# Patient Record
Sex: MALE | Race: WHITE | NOT HISPANIC OR LATINO | URBAN - METROPOLITAN AREA
[De-identification: names, ages, dates, MRNs, and addresses within clinical notes are randomized per-mention and may not be internally consistent; named-entity substitution may affect disease eponyms.]

---

## 2019-05-27 ENCOUNTER — EMERGENCY (EMERGENCY)
Facility: HOSPITAL | Age: 49
LOS: 1 days | Discharge: HOME | End: 2019-05-27
Attending: EMERGENCY MEDICINE
Payer: COMMERCIAL

## 2019-05-27 VITALS
TEMPERATURE: 98 F | OXYGEN SATURATION: 97 % | HEIGHT: 74 IN | DIASTOLIC BLOOD PRESSURE: 84 MMHG | WEIGHT: 164.91 LBS | HEART RATE: 88 BPM | RESPIRATION RATE: 20 BRPM | SYSTOLIC BLOOD PRESSURE: 124 MMHG

## 2019-05-27 VITALS
DIASTOLIC BLOOD PRESSURE: 76 MMHG | SYSTOLIC BLOOD PRESSURE: 119 MMHG | RESPIRATION RATE: 18 BRPM | TEMPERATURE: 98 F | OXYGEN SATURATION: 99 % | HEART RATE: 78 BPM

## 2019-05-27 PROCEDURE — 73030 X-RAY EXAM OF SHOULDER: CPT

## 2019-05-27 PROCEDURE — 99284 EMERGENCY DEPT VISIT MOD MDM: CPT | Mod: 25

## 2019-05-27 PROCEDURE — 73030 X-RAY EXAM OF SHOULDER: CPT | Mod: 26,LT

## 2019-05-27 PROCEDURE — 96374 THER/PROPH/DIAG INJ IV PUSH: CPT

## 2019-05-27 PROCEDURE — 99283 EMERGENCY DEPT VISIT LOW MDM: CPT

## 2019-05-27 RX ORDER — KETOROLAC TROMETHAMINE 30 MG/ML
30 SYRINGE (ML) INJECTION ONCE
Refills: 0 | Status: DISCONTINUED | OUTPATIENT
Start: 2019-05-27 | End: 2019-05-27

## 2019-05-27 RX ADMIN — Medication 30 MILLIGRAM(S): at 23:38

## 2019-05-27 NOTE — ED ADULT NURSE NOTE - CHPI ED NUR SYMPTOMS NEG
no tingling/no weakness/no difficulty bearing weight/no abrasion/no bruising/no numbness/no fever/no back pain

## 2019-05-27 NOTE — ED ADULT NURSE NOTE - NSSUSCREENINGQ2_ED_ALL_ED
Pulmonary/Chest: Effort normal and breath sounds normal. No respiratory distress. Abdominal: Soft. He exhibits no distension. Musculoskeletal: Normal range of motion. He exhibits tenderness. Left forearm: He exhibits tenderness, swelling and laceration. He exhibits no bony tenderness and no deformity. Arms:  Neurological: He is alert and oriented to person, place, and time. Skin: Skin is warm and dry. No rash noted. Psychiatric: He has a normal mood and affect. Nursing note and vitals reviewed. Vitals:    Vitals:    01/04/18 0432   BP: (!) 115/94   Pulse: 83   Resp: 18   Temp: 97.8 °F (36.6 °C)   TempSrc: Oral   SpO2: 98%   Weight: 170 lb (77.1 kg)   Height: 5' 9\" (1.753 m)       ORDERS AND MEDICATIONS     PLAN (LABS / IMAGING / EKG):  No orders of the defined types were placed in this encounter. Plan of care is reviewed and discussed with the family/ patient when able. Family/ Patient consents to treatment and plan if able to do so. MEDICATIONS ORDERED:  Orders Placed This Encounter   Medications    Tetanus-Diphth-Acell Pertussis (BOOSTRIX) injection 0.5 mL    ceFAZolin (ANCEF) 1 g in dextrose 5 % 50 mL IVPB (premix)    ibuprofen (ADVIL;MOTRIN) tablet 800 mg    ibuprofen (ADVIL;MOTRIN) 800 MG tablet     Sig: Take 1 tablet by mouth every 8 hours as needed for Pain     Dispense:  30 tablet     Refill:  0    cephALEXin (KEFLEX) 500 MG capsule     Sig: Take 1 capsule by mouth 2 times daily for 5 days     Dispense:  10 capsule     Refill:  0       DIAGNOSTIC RESULTS      LABS:  No results found for this visit on 01/04/18. Labs Reviewed - No data to display    RADIOLOGY:  No results found.       EKG      All EKG's are interpreted by the Emergency Department Physician who either signs or Co-signs this chart in the absence of a cardiologist.    ED BEDSIDE ULTRASOUND:   Not indicated      DIFFERENTIAL DX / [facility]     DIFFERENTIAL No

## 2019-05-27 NOTE — ED PROVIDER NOTE - PHYSICAL EXAMINATION
left shoulder tenderness, no bony deformities, distal radial and ulnar pulses intact, cap refill < 2 seconds, full range of motion of arm +elbow flexion/extension/supination and pronation intact, +flexion and extension of all digits at DIP and PIP.
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
## 2019-05-27 NOTE — ED ADULT NURSE NOTE - NSIMPLEMENTINTERV_GEN_ALL_ED
Implemented All Fall Risk Interventions:  [place] to call system. Call bell, personal items and telephone within reach. Instruct patient to call for assistance. Room bathroom lighting operational. Non-slip footwear when patient is off stretcher. Physically safe environment: no spills, clutter or unnecessary equipment. Stretcher in lowest position, wheels locked, appropriate side rails in place. Provide visual cue, wrist band, yellow gown, etc. Monitor gait and stability. Monitor for mental status changes and reorient to person, place, and time. Review medications for side effects contributing to fall risk. Reinforce activity limits and safety measures with patient and family.
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
## 2019-05-27 NOTE — ED PROVIDER NOTE - CLINICAL SUMMARY MEDICAL DECISION MAKING FREE TEXT BOX
Pt is neurovascularly intact in shoulder immobilizer. Pt is well appearing walking with normal gait, stable for discharge and follow up with medical doctor. Pt educated on care and need for follow up. Discussed anticipatory guidance and return precautions. Questions answered. I had a detailed discussion with the patient and/or guardian regarding the historical points, exam findings, and any diagnostic results supporting the discharge diagnosis.   pt given contact to f/u with ortho.

## 2021-05-25 ENCOUNTER — EMERGENCY (EMERGENCY)
Facility: HOSPITAL | Age: 51
LOS: 1 days | Discharge: HOME | End: 2021-05-25
Attending: EMERGENCY MEDICINE
Payer: COMMERCIAL

## 2021-05-25 VITALS
HEIGHT: 74 IN | RESPIRATION RATE: 18 BRPM | OXYGEN SATURATION: 98 % | HEART RATE: 79 BPM | SYSTOLIC BLOOD PRESSURE: 137 MMHG | WEIGHT: 175.05 LBS | TEMPERATURE: 98 F | DIASTOLIC BLOOD PRESSURE: 90 MMHG

## 2021-05-25 PROBLEM — Z78.9 OTHER SPECIFIED HEALTH STATUS: Chronic | Status: ACTIVE | Noted: 2019-05-27

## 2021-05-25 PROCEDURE — 73140 X-RAY EXAM OF FINGER(S): CPT

## 2021-05-25 PROCEDURE — 99283 EMERGENCY DEPT VISIT LOW MDM: CPT | Mod: 25

## 2021-05-25 PROCEDURE — 99283 EMERGENCY DEPT VISIT LOW MDM: CPT

## 2021-05-25 PROCEDURE — 73140 X-RAY EXAM OF FINGER(S): CPT | Mod: 26,RT

## 2021-05-25 NOTE — ED PROVIDER NOTE - CLINICAL SUMMARY MEDICAL DECISION MAKING FREE TEXT BOX
Neurovascularly intact. Low suspicion of dislocation and fx. High suspicion of mallet finger, will do X-ray and splint and d/c.

## 2021-05-25 NOTE — ED PROVIDER NOTE - CARE PROVIDERS DIRECT ADDRESSES
[email or web address]

## 2021-05-25 NOTE — ED PROVIDER NOTE - PATIENT PORTAL LINK FT
You can access the FollowMyHealth Patient Portal offered by [facility] by registering at the following website: [email or web address]. By joining [employer]’s FollowMyHealth portal, you will also be able to view your health information using other applications (apps) compatible with our system.

## 2021-05-25 NOTE — ED PROVIDER NOTE - PHYSICAL EXAMINATION
R hand pinky digit DIP in flexed position. Able to fully extend passively. Pt unable to actively extend finger. No deformity

## 2021-05-25 NOTE — ED PROVIDER NOTE - PROGRESS NOTE DETAILS
XR negative. Dx mallet finger. WIll dc with ortho hand follow up within 1 week. Pt is well appearing walking with steady gait, stable for discharge and follow up without fail with medical doctor. I had a detailed discussion with the patient and/or guardian regarding the historical points, exam findings, and any diagnostic results supporting the discharge diagnosis. Pt educated on care and need for follow up. Strict return instructions and red flag signs and symptoms discussed with patient. Questions answered. Pt shows understanding of discharge information and agrees to follow.

## 2021-05-25 NOTE — ED PROVIDER NOTE - ATTENDING CONTRIBUTION TO CARE
I was physically present for the E/M service provided. I agree with above history, physical, and plan which I have reviewed and edited where appropriate. I was physically present for the key portions of the service provided.    [place]: 51 y/o M pt with no significant PMHx presents to ED c/o R 5th finger stuck in flexed position just PTA. Pt was scratching his chest at home when he heard a pop and looked down and saw his pinky appeared to remain flexed. no blunt trauma    mallet deformity, no swelling    a/p: mallet deformity. splint and hand follow up

## 2021-05-25 NOTE — ED PROVIDER NOTE - NSFOLLOWUPINSTRUCTIONS_ED_ALL_ED_FT
Follow up with the orthopedist within 1 week.  If you experience any new or worsening symptoms or if you are concerned you can always come back to the emergency for a re-evaluation.  If there were any prescriptions given to you during the visit today take them as prescribed. If you have any questions you can ask the pharmacist.

## 2021-05-25 NOTE — ED PROVIDER NOTE - CARE PROVIDER_API CALL
Campbell Martínez)  Orthopaedic Surgery  [address] [place] [address], [address]  [place] [address]  Phone: [phone]  Fax: [phone]  Follow Up Time:     Sly Rangel)  Orthopaedic Surgery  [address] [facility] [address]  [place] [address]  Phone: [phone]  Fax: [phone]  Follow Up Time:

## 2021-05-25 NOTE — ED PROVIDER NOTE - OBJECTIVE STATEMENT
51 y/o M pt with no significant PMHx presents to ED c/o R 5th finger stuck in flexed position just PTA. Pt was scratching his chest at home when he heard a pop and looked down and saw his pinky appeared to remain flexed. Pt still with full range of motion of all 5 fingers. Pt denies numbness, tingling, pain or any other acute complaints. NKDA.

## 2023-06-18 ENCOUNTER — EMERGENCY (EMERGENCY)
Facility: HOSPITAL | Age: 53
LOS: 1 days | Discharge: HOME | End: 2023-06-18
Attending: EMERGENCY MEDICINE
Payer: COMMERCIAL

## 2023-06-18 VITALS
DIASTOLIC BLOOD PRESSURE: 88 MMHG | TEMPERATURE: 98 F | SYSTOLIC BLOOD PRESSURE: 127 MMHG | HEIGHT: 72 IN | OXYGEN SATURATION: 96 % | WEIGHT: 175.05 LBS | RESPIRATION RATE: 16 BRPM | HEART RATE: 85 BPM

## 2023-06-18 LAB
ALBUMIN SERPL ELPH-MCNC: 3.6 G/DL — SIGNIFICANT CHANGE UP (ref 3.5–5)
ALP SERPL-CCNC: 75 U/L — SIGNIFICANT CHANGE UP (ref 40–120)
ALT FLD-CCNC: 25 U/L DA — SIGNIFICANT CHANGE UP (ref 10–60)
ANION GAP SERPL CALC-SCNC: 8 MMOL/L — SIGNIFICANT CHANGE UP (ref 5–17)
AST SERPL-CCNC: 11 U/L — SIGNIFICANT CHANGE UP (ref 10–40)
BASOPHILS # BLD AUTO: 0.09 K/UL — SIGNIFICANT CHANGE UP (ref 0–0.2)
BASOPHILS NFR BLD AUTO: 0.7 % — SIGNIFICANT CHANGE UP (ref 0–2)
BILIRUB SERPL-MCNC: 0.3 MG/DL — SIGNIFICANT CHANGE UP (ref 0.2–1.2)
BUN SERPL-MCNC: 19 MG/DL — HIGH (ref 7–18)
CALCIUM SERPL-MCNC: 8.8 MG/DL — SIGNIFICANT CHANGE UP (ref 8.4–10.5)
CHLORIDE SERPL-SCNC: 110 MMOL/L — HIGH (ref 96–108)
CO2 SERPL-SCNC: 25 MMOL/L — SIGNIFICANT CHANGE UP (ref 22–31)
CREAT SERPL-MCNC: 1.11 MG/DL — SIGNIFICANT CHANGE UP (ref 0.5–1.3)
EGFR: 80 ML/MIN/1.73M2 — SIGNIFICANT CHANGE UP
EOSINOPHIL # BLD AUTO: 1.02 K/UL — HIGH (ref 0–0.5)
EOSINOPHIL NFR BLD AUTO: 8.2 % — HIGH (ref 0–6)
GLUCOSE SERPL-MCNC: 109 MG/DL — HIGH (ref 70–99)
HCT VFR BLD CALC: 44.1 % — SIGNIFICANT CHANGE UP (ref 39–50)
HGB BLD-MCNC: 14.8 G/DL — SIGNIFICANT CHANGE UP (ref 13–17)
IMM GRANULOCYTES NFR BLD AUTO: 0.2 % — SIGNIFICANT CHANGE UP (ref 0–0.9)
LYMPHOCYTES # BLD AUTO: 2.96 K/UL — SIGNIFICANT CHANGE UP (ref 1–3.3)
LYMPHOCYTES # BLD AUTO: 23.8 % — SIGNIFICANT CHANGE UP (ref 13–44)
MCHC RBC-ENTMCNC: 30 PG — SIGNIFICANT CHANGE UP (ref 27–34)
MCHC RBC-ENTMCNC: 33.6 GM/DL — SIGNIFICANT CHANGE UP (ref 32–36)
MCV RBC AUTO: 89.3 FL — SIGNIFICANT CHANGE UP (ref 80–100)
MONOCYTES # BLD AUTO: 1.3 K/UL — HIGH (ref 0–0.9)
MONOCYTES NFR BLD AUTO: 10.5 % — SIGNIFICANT CHANGE UP (ref 2–14)
NEUTROPHILS # BLD AUTO: 7.03 K/UL — SIGNIFICANT CHANGE UP (ref 1.8–7.4)
NEUTROPHILS NFR BLD AUTO: 56.6 % — SIGNIFICANT CHANGE UP (ref 43–77)
NRBC # BLD: 0 /100 WBCS — SIGNIFICANT CHANGE UP (ref 0–0)
PLATELET # BLD AUTO: 340 K/UL — SIGNIFICANT CHANGE UP (ref 150–400)
POTASSIUM SERPL-MCNC: 3.7 MMOL/L — SIGNIFICANT CHANGE UP (ref 3.5–5.3)
POTASSIUM SERPL-SCNC: 3.7 MMOL/L — SIGNIFICANT CHANGE UP (ref 3.5–5.3)
PROT SERPL-MCNC: 7.4 G/DL — SIGNIFICANT CHANGE UP (ref 6–8.3)
RBC # BLD: 4.94 M/UL — SIGNIFICANT CHANGE UP (ref 4.2–5.8)
RBC # FLD: 12.2 % — SIGNIFICANT CHANGE UP (ref 10.3–14.5)
SODIUM SERPL-SCNC: 143 MMOL/L — SIGNIFICANT CHANGE UP (ref 135–145)
WBC # BLD: 12.42 K/UL — HIGH (ref 3.8–10.5)
WBC # FLD AUTO: 12.42 K/UL — HIGH (ref 3.8–10.5)

## 2023-06-18 PROCEDURE — 80053 COMPREHEN METABOLIC PANEL: CPT

## 2023-06-18 PROCEDURE — 99284 EMERGENCY DEPT VISIT MOD MDM: CPT | Mod: 25

## 2023-06-18 PROCEDURE — 96374 THER/PROPH/DIAG INJ IV PUSH: CPT

## 2023-06-18 PROCEDURE — 85025 COMPLETE CBC W/AUTO DIFF WBC: CPT

## 2023-06-18 PROCEDURE — 99284 EMERGENCY DEPT VISIT MOD MDM: CPT

## 2023-06-18 PROCEDURE — 94640 AIRWAY INHALATION TREATMENT: CPT

## 2023-06-18 PROCEDURE — 36415 COLL VENOUS BLD VENIPUNCTURE: CPT

## 2023-06-18 RX ORDER — ALBUTEROL 90 UG/1
3 AEROSOL, METERED ORAL
Qty: 36 | Refills: 0
Start: 2023-06-18 | End: 2023-07-17

## 2023-06-18 RX ORDER — ALBUTEROL 90 UG/1
2 AEROSOL, METERED ORAL
Qty: 3.7 | Refills: 0
Start: 2023-06-18 | End: 2023-07-17

## 2023-06-18 RX ORDER — IPRATROPIUM/ALBUTEROL SULFATE 18-103MCG
3 AEROSOL WITH ADAPTER (GRAM) INHALATION ONCE
Refills: 0 | Status: COMPLETED | OUTPATIENT
Start: 2023-06-18 | End: 2023-06-18

## 2023-06-18 RX ADMIN — Medication 3 MILLILITER(S): at 19:26

## 2023-06-18 RX ADMIN — Medication 3 MILLILITER(S): at 19:31

## 2023-06-18 RX ADMIN — Medication 125 MILLIGRAM(S): at 19:26

## 2023-06-18 NOTE — ED PROVIDER NOTE - PROGRESS NOTE DETAILS
wheezing resolved. feels improved, asking to go home. will dc. rx for albuterol and prednisone. f/u with PMD. return precautions discussed.

## 2023-06-18 NOTE — ED ADULT TRIAGE NOTE - CHIEF COMPLAINT QUOTE
C/o "my breathing is off, I have asthma, I used my albuterol nebulizer but its not breaking it" C/o "my breathing is off, I have asthma, I used my albuterol nebulizer but its not breaking it". Peak flow 260

## 2023-06-18 NOTE — ED PROVIDER NOTE - CARDIAC, MLM
Hypoglycemia guideline Normal rate, regular rhythm.  Heart sounds S1, S2.  No murmurs, rubs or gallops.

## 2023-06-18 NOTE — ED PROVIDER NOTE - NSFOLLOWUPINSTRUCTIONS_ED_ALL_ED_FT
Asthma, Adult  Outline of a person's upper body showing the lungs, with close-ups of two airways, one normal and one narrow.  Asthma is a long-term (chronic) condition that causes recurrent episodes in which the lower airways in the lungs become tight and narrow. The narrowing is caused by inflammation and tightening of the smooth muscle around the lower airways.    Asthma episodes, also called asthma attacks or asthma flares, may cause coughing, making high-pitched whistling sounds when you breathe, most often when you breathe out (wheezing), shortness of breath, and chest pain. The airways may produce extra mucus caused by the inflammation and irritation. During an attack, it can be difficult to breathe. Asthma attacks can range from minor to life-threatening.    Asthma cannot be cured, but medicines and lifestyle changes can help control it and treat acute attacks. It is important to keep your asthma well controlled so the condition does not interfere with your daily life.    What are the causes?  This condition is believed to be caused by inherited (genetic) and environmental factors, but its exact cause is not known.    What can trigger an asthma attack?    Many things can bring on an asthma attack or make symptoms worse. These triggers are different for every person. Common triggers include:  Allergens and irritants like mold, dust, pet dander, cockroaches, pollen, air pollution, and chemical odors.  Cigarette smoke.  Weather changes and cold air.  Stress and strong emotional responses such as crying or laughing hard.  Certain medications such as aspirin or beta blockers.  Infections and inflammatory conditions, such as the flu, a cold, pneumonia, or inflammation of the nasal membranes (rhinitis).  Gastroesophageal reflux disease (GERD).  What are the signs or symptoms?  Symptoms may occur right after exposure to an asthma trigger or hours later and can vary by person. Common signs and symptoms include:  Wheezing.  Trouble breathing (shortness of breath).  Excessive nighttime or early morning coughing.  Chest tightness.  Tiredness (fatigue) with minimal activity.  Difficulty talking in complete sentences.  Poor exercise tolerance.  How is this diagnosed?  This condition is diagnosed based on:  A physical exam and your medical history.  Tests, which may include:  Lung function studies to evaluate the flow of air in your lungs.  Allergy tests.  Imaging tests, such as X-rays.  How is this treated?  There is no cure, but symptoms can be controlled with proper treatment. Treatment usually involves:  Identifying and avoiding your asthma triggers.  Inhaled medicines. Two types are commonly used to treat asthma, depending on severity:  Controller medicines. These help prevent asthma symptoms from occurring. They are taken every day.  Fast-acting reliever or rescue medicines. These quickly relieve asthma symptoms. They are used as needed and provide short-term relief.  Using other medicines, such as:  Allergy medicines, such as antihistamines, if your asthma attacks are triggered by allergens.  Immune medicines (immunomodulators). These are medicines that help control the immune system.  Using supplemental oxygen. This is only needed during a severe episode.  Creating an asthma action plan. An asthma action plan is a written plan for managing and treating your asthma attacks. This plan includes:  A list of your asthma triggers and how to avoid them.  Information about when medicines should be taken and when their dosage should be changed.  Instructions about using a device called a peak flow meter. A peak flow meter measures how well the lungs are working and the severity of your asthma. It helps you monitor your condition.  Follow these instructions at home:  Take over-the-counter and prescription medicines only as told by your health care provider.  Stay up to date on all vaccinations as recommended by your healthcare provider, including vaccines for the flu and pneumonia.  Use a peak flow meter and keep track of your peak flow readings.  Understand and use your asthma action plan to address any asthma flares.  Do not smoke or allow anyone to smoke in your home.  Contact a health care provider if:  You have wheezing, shortness of breath, or a cough that is not responding to medicines.  Your medicines are causing side effects, such as a rash, itching, swelling, or trouble breathing.  You need to use a reliever medicine more than 2–3 times a week.  Your peak flow reading is still at 50–79% of your personal best after following your action plan for 1 hour.  You have a fever and shortness of breath.  Get help right away if:  You are getting worse and do not respond to treatment during an asthma attack.  You are short of breath when at rest or when doing very little physical activity.  You have difficulty eating, drinking, or talking.  You have chest pain or tightness.  You develop a fast heartbeat or palpitations.  You have a bluish color to your lips or fingernails.  You are light-headed or dizzy, or you faint.  Your peak flow reading is less than 50% of your personal best.  You feel too tired to breathe normally.  These symptoms may be an emergency. Get help right away. Call 911.  Do not wait to see if the symptoms will go away.  Do not drive yourself to the hospital.  Summary  Asthma is a long-term (chronic) condition that causes recurrent episodes in which the airways become tight and narrow. Asthma episodes, also called asthma attacks or asthma flares, can cause coughing, wheezing, shortness of breath, and chest pain.  Asthma cannot be cured, but medicines and lifestyle changes can help keep it well controlled and prevent asthma flares.  Make sure you understand how to avoid triggers and how and when to use your medicines.  Asthma attacks can range from minor to life-threatening. Get help right away if you have an asthma attack and do not respond to treatment with your usual rescue medicines.  This information is not intended to replace advice given to you by your health care provider. Make sure you discuss any questions you have with your health care provider.    Document Revised: 10/05/2022 Document Reviewed: 09/26/2022

## 2023-06-18 NOTE — ED PROVIDER NOTE - PATIENT PORTAL LINK FT
You can access the FollowMyHealth Patient Portal offered by [facility] by registering at the following website: [email or web address]. By joining [employer]’s FollowMyHealth portal, you will also be able to view your health information using other applications (apps) compatible with our system.

## 2023-06-18 NOTE — ED PROVIDER NOTE - OBJECTIVE STATEMENT
52 year old male PMH asthma coming in with few days of asthma exacerbation not completely relieved by his nebs at home. denies all other complaints.

## 2023-06-19 NOTE — ED ADULT NURSE NOTE - OBJECTIVE STATEMENT
As per pt, c/o SOB s/p asthma exacerbation today. Pt reports, h/o oral intubation for asthma in the past. Pt denies all other symptoms.

## 2023-06-19 NOTE — ED ADULT NURSE NOTE - CHIEF COMPLAINT QUOTE
C/o "my breathing is off, I have asthma, I used my albuterol nebulizer but its not breaking it". Peak flow 260

## 2023-06-19 NOTE — ED ADULT NURSE NOTE - NSFALLUNIVINTERV_ED_ALL_ED
Bed/Stretcher in lowest position, wheels locked, appropriate side rails in place/Call bell, personal items and telephone in reach/Instruct patient to call for assistance before getting out of bed/chair/stretcher/Non-slip footwear applied when patient is off stretcher/[place] to call system/Physically safe environment - no spills, clutter or unnecessary equipment/Purposeful proactive rounding/Room/bathroom lighting operational, light cord in reach

## 2025-05-27 ENCOUNTER — EMERGENCY (EMERGENCY)
Facility: HOSPITAL | Age: 55
LOS: 1 days | End: 2025-05-27
Attending: EMERGENCY MEDICINE
Payer: COMMERCIAL

## 2025-05-27 VITALS
RESPIRATION RATE: 18 BRPM | OXYGEN SATURATION: 97 % | DIASTOLIC BLOOD PRESSURE: 93 MMHG | HEART RATE: 73 BPM | SYSTOLIC BLOOD PRESSURE: 151 MMHG | TEMPERATURE: 98 F

## 2025-05-27 VITALS
HEIGHT: 73 IN | OXYGEN SATURATION: 94 % | WEIGHT: 169.98 LBS | HEART RATE: 85 BPM | DIASTOLIC BLOOD PRESSURE: 86 MMHG | RESPIRATION RATE: 17 BRPM | TEMPERATURE: 98 F | SYSTOLIC BLOOD PRESSURE: 129 MMHG

## 2025-05-27 PROCEDURE — 94640 AIRWAY INHALATION TREATMENT: CPT

## 2025-05-27 PROCEDURE — 99283 EMERGENCY DEPT VISIT LOW MDM: CPT | Mod: 25

## 2025-05-27 PROCEDURE — 99284 EMERGENCY DEPT VISIT MOD MDM: CPT

## 2025-05-27 RX ORDER — ALBUTEROL SULFATE 2.5 MG/3ML
2 VIAL, NEBULIZER (ML) INHALATION ONCE
Refills: 0 | Status: COMPLETED | OUTPATIENT
Start: 2025-05-27 | End: 2025-05-27

## 2025-05-27 RX ORDER — ALBUTEROL SULFATE 2.5 MG/3ML
2 VIAL, NEBULIZER (ML) INHALATION
Qty: 1 | Refills: 0
Start: 2025-05-27 | End: 2025-06-25

## 2025-05-27 RX ORDER — PREDNISONE 20 MG/1
60 TABLET ORAL ONCE
Refills: 0 | Status: COMPLETED | OUTPATIENT
Start: 2025-05-27 | End: 2025-05-27

## 2025-05-27 RX ORDER — PREDNISONE 20 MG/1
1 TABLET ORAL
Qty: 14 | Refills: 0
Start: 2025-05-27 | End: 2025-06-01

## 2025-05-27 RX ORDER — IPRATROPIUM BROMIDE AND ALBUTEROL SULFATE .5; 2.5 MG/3ML; MG/3ML
3 SOLUTION RESPIRATORY (INHALATION)
Refills: 0 | Status: ACTIVE | OUTPATIENT
Start: 2025-05-27 | End: 2025-05-27

## 2025-05-27 RX ADMIN — PREDNISONE 60 MILLIGRAM(S): 20 TABLET ORAL at 19:16

## 2025-05-27 RX ADMIN — IPRATROPIUM BROMIDE AND ALBUTEROL SULFATE 3 MILLILITER(S): .5; 2.5 SOLUTION RESPIRATORY (INHALATION) at 19:23

## 2025-05-27 RX ADMIN — IPRATROPIUM BROMIDE AND ALBUTEROL SULFATE 3 MILLILITER(S): .5; 2.5 SOLUTION RESPIRATORY (INHALATION) at 19:24

## 2025-05-27 NOTE — ED ADULT NURSE REASSESSMENT NOTE - NS ED NURSE REASSESS COMMENT FT1
Discharge papers reviewed  Denies questions or concerns  Pt ambulated out of department with steady gait

## 2025-05-27 NOTE — ED ADULT NURSE NOTE - OBJECTIVE STATEMENT
53 yo male presents to the ED for asthma exacerbation x 4 days, presents today b/ he ran out of his albuterol inhaler  Aside from asthma (made worse by season) denies medical history  Takes albuterol PRN for asthma exacerbation    Airway intact, speaking in clear complete sentences  Mild shortness of breath noted  Diffuse bilateral expiratory wheezes  Cap refill <2s  2+ pulses in extremities  No swelling to extremities

## 2025-05-27 NOTE — ED ADULT NURSE REASSESSMENT NOTE - NS ED NURSE REASSESS COMMENT FT1
Mild bilateral wheezes auscultated after 1st nebulizer treatment  2nd nebulizer treatment administered as per MAR  Patient assessed after 2nd nebulizer treatment, lungs clear to auscultation bilaterally, shortness of breath resolved, pt reports feeling better. Dr. Joiner made aware with plan to d/c and Rx being sent to pharmacy for albuterol inhaler

## 2025-05-27 NOTE — ED PROVIDER NOTE - NSFOLLOWUPINSTRUCTIONS_ED_ALL_ED_FT
Asthma    WHAT YOU NEED TO KNOW:    Asthma is a lung disease that makes breathing difficult. Chronic inflammation and reactions to triggers narrow the airways in the lungs. Asthma can become life-threatening if it is not managed.  Normal vs Asthmatic Bronchioles Adult    DISCHARGE INSTRUCTIONS:    Call your local emergency number (911 in the [place]) if:    You have severe shortness of breath.    The skin around your neck and ribs pulls in with each breath.    Your peak flow numbers are in the red zone of your AAP.  Seek care immediately if:    You have shortness of breath, even after you take your short-term medicine as directed.    Your lips or nails turn blue or gray.  Call your doctor or asthma specialist if:    You run out of medicine before your next refill is due.    Your symptoms get worse.    You need to take more medicine than usual to control your symptoms.    You have questions or concerns about your condition or care.  Medicines:    Medicines may be used to decrease inflammation, open airways, and make it easier to breathe. Medicines may be inhaled, taken as a pill, or injected. Short-term medicines relieve your symptoms quickly. Long-term medicines are used to prevent future asthma attacks. Other medicines may be needed if your regular medicines are not able to prevent attacks. You may also need medicine to help control your allergies. Ask your healthcare provider for more information about any medicine you are given and how to take it safely.    Take your medicine as directed. Contact your healthcare provider if you think your medicine is not helping or if you have side effects. Tell your provider if you are allergic to any medicine. Keep a list of the medicines, vitamins, and herbs you take. Include the amounts, and when and why you take them. Bring the list or the pill bottles to follow-up visits. Carry your medicine list with you in case of an emergency.  Manage your symptoms and prevent future attacks:    Follow your Asthma Action Plan (AAP). This is a written plan that you and your healthcare provider create. It explains which medicine you need and when to change doses if necessary. It also explains how you can monitor symptoms and use a peak flow meter. The meter measures how well your lungs are working.    Manage other health conditions, such as allergies, acid reflux, and sleep apnea.    Identify and avoid triggers. These may include pets, dust mites, mold, and cockroaches.    Do not smoke or be around others who smoke. Nicotine and other chemicals in cigarettes and cigars can cause lung damage. Ask your healthcare provider for information if you currently smoke and need help to quit. E-cigarettes or smokeless tobacco still contain nicotine. Talk to your healthcare provider before you use these products.    Ask about the flu vaccine. The flu can make your asthma worse. You may need a yearly flu shot.  Follow up with your healthcare provider as directed: You will need to return to make sure your medicine is working and your symptoms are controlled. You may be referred to an asthma or allergy specialist. You may be asked to keep a record of your peak flow values and bring it with you to your appointments. Write down your questions so you remember to ask them during your visits.    © [employer] 1973, 2025    	  back to top

## 2025-05-27 NOTE — ED PROVIDER NOTE - PATIENT PORTAL LINK FT
You can access the FollowMyHealth Patient Portal offered by [facility] by registering at the following website: [email or web address]. By joining [employer]’s FollowMyHealth portal, you will also be able to view your health information using other applications (apps) compatible with our system.

## 2025-05-27 NOTE — ED PROVIDER NOTE - OBJECTIVE STATEMENT
54-year-old male history of asthma reports triggers changes weather presenting with shortness of breath with nonproductive cough and wheezing since the morning time.  States he ran out of inhaler.  Came to the ER since he did not want his symptoms to get worse.  Denies any chest pain, fever, leg swelling, travel or sick contact. Denies any history intubations.

## 2025-05-27 NOTE — ED PROVIDER NOTE - CLINICAL SUMMARY MEDICAL DECISION MAKING FREE TEXT BOX
54-year-old male with history of asthma presenting with wheezing currently in no acute distress.  Speaking full sentences.  Does not want to remove clothing to examine him.  Exam to suggest acute asthma.  No obvious signs of pneumonia.  Demanding nebulizers steroids and and states he wants to leave immediately afterwards.  "This is not the first time I have done this".  Discussed plan of care and signs and symptoms to return sooner to ED.

## 2025-07-28 ENCOUNTER — EMERGENCY (EMERGENCY)
Facility: HOSPITAL | Age: 55
LOS: 1 days | End: 2025-07-28
Attending: EMERGENCY MEDICINE
Payer: COMMERCIAL

## 2025-07-28 VITALS
WEIGHT: 169.98 LBS | RESPIRATION RATE: 25 BRPM | SYSTOLIC BLOOD PRESSURE: 143 MMHG | OXYGEN SATURATION: 98 % | TEMPERATURE: 98 F | HEART RATE: 101 BPM | DIASTOLIC BLOOD PRESSURE: 103 MMHG

## 2025-07-28 PROCEDURE — 99284 EMERGENCY DEPT VISIT MOD MDM: CPT

## 2025-07-28 RX ORDER — ALBUTEROL SULFATE 2.5 MG/3ML
2 VIAL, NEBULIZER (ML) INHALATION
Qty: 1 | Refills: 0
Start: 2025-07-28

## 2025-07-28 RX ORDER — ALBUTEROL SULFATE 2.5 MG/3ML
3 VIAL, NEBULIZER (ML) INHALATION
Qty: 50 | Refills: 0
Start: 2025-07-28

## 2025-07-28 RX ORDER — PREDNISONE 20 MG/1
2 TABLET ORAL
Qty: 10 | Refills: 0
Start: 2025-07-28 | End: 2025-08-01

## 2025-07-28 NOTE — ED PROVIDER NOTE - WET READ LAUNCH FT
DISPLAY PLAN FREE TEXT There are no Wet Read(s) to document. DISPLAY PLAN FREE TEXT DISPLAY PLAN FREE TEXT DISPLAY PLAN FREE TEXT DISPLAY PLAN FREE TEXT DISPLAY PLAN FREE TEXT DISPLAY PLAN FREE TEXT DISPLAY PLAN FREE TEXT DISPLAY PLAN FREE TEXT DISPLAY PLAN FREE TEXT DISPLAY PLAN FREE TEXT DISPLAY PLAN FREE TEXT DISPLAY PLAN FREE TEXT DISPLAY PLAN FREE TEXT DISPLAY PLAN FREE TEXT DISPLAY PLAN FREE TEXT DISPLAY PLAN FREE TEXT DISPLAY PLAN FREE TEXT DISPLAY PLAN FREE TEXT DISPLAY PLAN FREE TEXT DISPLAY PLAN FREE TEXT DISPLAY PLAN FREE TEXT DISPLAY PLAN FREE TEXT DISPLAY PLAN FREE TEXT DISPLAY PLAN FREE TEXT

## 2025-07-28 NOTE — ED PROVIDER NOTE - NSFOLLOWUPINSTRUCTIONS_ED_ALL_ED_FT
Asthma Attack  Upper body outline showing parts of the respiratory system, highlighting the bronchi.  Asthma attack, also called asthma flare or acute bronchospasm, is the sudden narrowing and tightening of the lower airways (bronchi) in the lungs, that can make it hard to breathe. The narrowing is caused by inflammation and tightening of the smooth muscle that wraps around the lower airways in the lungs.    Asthma attacks may cause coughing, high-pitched whistling sounds when you breathe, most often when you breathe out (wheezing), trouble breathing (shortness of breath), and chest pain. The airways may produce extra mucus caused by the inflammation and irritation. During an asthma attack, it can be difficult to breathe. It is important to get treatment right away. Asthma attacks can range from minor to life-threatening.    What are the causes?  Possible causes or triggers of this condition include:  Household allergens like dust, pet dander, and cockroaches.  Mold and pollen from trees or grass.  Air pollutants such as household , aerosol sprays, strong odors, and smoke of any kind.  Weather changes and cold air.  Stress or strong emotions such as crying or laughing hard.  Exercise or activity that requires a lot of energy.  Certain medicines or medical conditions such as:  Aspirin or beta-blockers.  Infections or inflammatory conditions, such as a flu (influenza), a cold, pneumonia, or inflammation of the nasal membranes (rhinitis).  Gastroesophageal reflux disease (GERD). GERD is a condition in which stomach acid backs up into your esophagus and spills into your trachea (windpipe), which can irritate your airways.  What are the signs or symptoms?  Symptoms of this condition include:  Wheezing.  Excessive coughing. This may only happen at night.  Chest tightness or pain.  Shortness of breath.  Difficulty talking in complete sentences.  Feeling like you cannot get enough air, no matter how hard you breathe (air hunger).  How is this diagnosed?  This condition may be diagnosed based on:  A physical exam and your medical history.  Your symptoms.  Tests to check for other causes of your symptoms or other conditions that may have triggered your asthma attack. These tests may include:  A chest X-ray.  Blood tests.  Lung function studies (spirometry) to evaluate the flow of air in your lungs.  How is this treated?  Treatment for this condition depends on the severity and cause of your asthma attack.  For mild attacks, you may receive medicines through a hand-held inhaler (metered dose inhaler or MDI) or through a device that turns liquid medicine into a mist (nebulizer). These medicines include:  Quick relief or rescue medicines that quickly relax the airways and lungs.  Long-acting medicines that are used daily to prevent (control) your asthma symptoms.  For moderate or severe attacks, you may be treated with steroid medicines by mouth or through an IV injection at the hospital.  For severe attacks, you may need oxygen therapy or a breathing machine (ventilator).  If your asthma attack was caused by an infection from bacteria, you will be given antibiotic medicines.  Follow these instructions at home:  Medicines    Take over-the-counter and prescription medicines only as told by your health care provider.  If you were prescribed an antibiotic medicine, take it as told by your health care provider. Do not stop using the antibiotic even if you start to feel better.  Tell your doctor if you are pregnant or may be pregnant to make sure your asthma medicine is safe to use during pregnancy.  Avoiding triggers    A sign showing that a person should not smoke.  Keep track of things that trigger your asthma attacks. Avoid exposure to these triggers.  Do not use any products that contain nicotine or tobacco. These products include cigarettes, chewing tobacco, and vaping devices, such as e-cigarettes. If you need help quitting, ask your health care provider.  When there is a lot of pollen, air pollution, or humidity, keep windows closed and use an air conditioner or go to places with air conditioning.  Asthma action plan    Work with your health care provider to make a written plan for managing and treating your asthma attacks (asthma action plan). This plan should include:  A list of your asthma triggers and how to avoid them.  A list of symptoms that you may have during an asthma attack.  Information about which medicine to take, when to take the medicine, and how much of the medicine to take.  Information to help you understand your peak flow measurements.  Daily actions that you can take to control your asthma symptoms.  Contact information for your health care providers.  If you have an asthma attack, act quickly. Follow the emergency steps on your written asthma action plan. This may prevent you from needing to go to the hospital.  Talk to a family member or close friend about your asthma action plan and who to contact in case you need help.  General instructions    Avoid excessive exercise or activity until your asthma attack goes away.  Stay up to date on all your vaccines, such as flu and pneumonia vaccines.  Keep all follow-up visits. This is important.  Contact a health care provider if:  You have followed your action plan for 1 hour and your peak flow reading is still at 50–79% of your personal best. This is in the yellow zone, which means "caution."  You need to use your quick reliever medicine more frequently than normal.  Your medicines are causing side effects, such as rash, itching, swelling, or trouble breathing.  Your symptoms do not improve after taking medicine.  You have a fever.  Get help right away if:  Your peak flow reading is less than 50% of your personal best. This is in the red zone, which means "danger."  You develop chest pain or discomfort.  Your medicines no longer seem to be helping.  You are coughing up bloody mucus.  You have a fever and your symptoms suddenly get worse.  You have trouble swallowing.  You feel very tired, and breathing becomes tiring.  These symptoms may be an emergency. Get help right away. Call 911.  Do not wait to see if the symptoms will go away.  Do not drive yourself to the hospital.  Summary  Asthma attacks are caused by narrowing or tightness in air passages, which causes shortness of breath, coughing, and wheezing.  Many things can trigger an asthma attack, such as allergens, weather changes, exercise, strong odors, and smoke of any kind.  If you have an asthma attack, act quickly. Follow the emergency steps on your written asthma action plan.  Get help right away if you have severe trouble breathing, chest pain, or fever, or if your home medicines are no longer helping with your symptoms.  This information is not intended to replace advice given to you by your health care provider. Make sure you discuss any questions you have with your health care provider.    Document Revised: 10/09/2022 Document Reviewed: 10/09/2022  [employer]vier Patient Education © 2025 [employer]

## 2025-07-28 NOTE — ED ADULT NURSE NOTE - NSFALLUNIVINTERV_ED_ALL_ED
Bed/Stretcher in lowest position, wheels locked, appropriate side rails in place/Call bell, personal items and telephone in reach/Instruct patient to call for assistance before getting out of bed/chair/stretcher/Non-slip footwear applied when patient is off stretcher/[place] to call system/Physically safe environment - no spills, clutter or unnecessary equipment/Purposeful proactive rounding/Room/bathroom lighting operational, light cord in reach

## 2025-07-28 NOTE — ED PROVIDER NOTE - PATIENT PORTAL LINK FT
You can access the FollowMyHealth Patient Portal offered by [facility] by registering at the following website: [email or web address]. By joining [employer]’s FollowMyHealth portal, you will also be able to view your health information using other applications (apps) compatible with our system.

## 2025-07-28 NOTE — ED PROVIDER NOTE - OBJECTIVE STATEMENT
patient history asthma presents with 2 days of wheezing chest tightness shortness of breath.  Has been using nebulizer and asthma pump at home with no improvement.  At baseline he does not use any rescue inhalers or any controller medication. patient thinks its the humidity and poor air quality that is causing this attack

## 2025-07-28 NOTE — ED PROVIDER NOTE - CLINICAL SUMMARY MEDICAL DECISION MAKING FREE TEXT BOX
Patient with asthma with exacerbation  Feeling well requesting discharge.  Will send home with prescription of prednisone for 5 days as well as albuterol.  Discharged with home health follow-up